# Patient Record
Sex: FEMALE | Race: WHITE | Employment: OTHER | URBAN - NONMETROPOLITAN AREA
[De-identification: names, ages, dates, MRNs, and addresses within clinical notes are randomized per-mention and may not be internally consistent; named-entity substitution may affect disease eponyms.]

---

## 2018-04-05 ENCOUNTER — TRANSFERRED RECORDS (OUTPATIENT)
Dept: HEALTH INFORMATION MANAGEMENT | Facility: HOSPITAL | Age: 73
End: 2018-04-05

## 2018-05-01 RX ORDER — MULTIPLE VITAMINS W/ MINERALS TAB 9MG-400MCG
1 TAB ORAL DAILY
COMMUNITY

## 2018-05-08 ENCOUNTER — ANESTHESIA (OUTPATIENT)
Dept: SURGERY | Facility: HOSPITAL | Age: 73
End: 2018-05-08
Payer: MEDICARE

## 2018-05-08 ENCOUNTER — ANESTHESIA EVENT (OUTPATIENT)
Dept: SURGERY | Facility: HOSPITAL | Age: 73
End: 2018-05-08
Payer: MEDICARE

## 2018-05-08 ENCOUNTER — HOSPITAL ENCOUNTER (OUTPATIENT)
Facility: HOSPITAL | Age: 73
Discharge: HOME | End: 2018-05-08
Attending: OPHTHALMOLOGY | Admitting: OPHTHALMOLOGY
Payer: MEDICARE

## 2018-05-08 ENCOUNTER — SURGERY (OUTPATIENT)
Age: 73
End: 2018-05-08

## 2018-05-08 VITALS
SYSTOLIC BLOOD PRESSURE: 102 MMHG | TEMPERATURE: 97.1 F | HEIGHT: 63 IN | BODY MASS INDEX: 21.97 KG/M2 | WEIGHT: 124 LBS | DIASTOLIC BLOOD PRESSURE: 60 MMHG | OXYGEN SATURATION: 97 % | RESPIRATION RATE: 18 BRPM

## 2018-05-08 PROCEDURE — 36000056 ZZH SURGERY LEVEL 3 1ST 30 MIN: Performed by: OPHTHALMOLOGY

## 2018-05-08 PROCEDURE — A9270 NON-COVERED ITEM OR SERVICE: HCPCS | Mod: GY | Performed by: OPHTHALMOLOGY

## 2018-05-08 PROCEDURE — 25000125 ZZHC RX 250: Performed by: OPHTHALMOLOGY

## 2018-05-08 PROCEDURE — 25000128 H RX IP 250 OP 636: Performed by: NURSE ANESTHETIST, CERTIFIED REGISTERED

## 2018-05-08 PROCEDURE — 25000132 ZZH RX MED GY IP 250 OP 250 PS 637: Mod: GY | Performed by: OPHTHALMOLOGY

## 2018-05-08 PROCEDURE — 66984 XCAPSL CTRC RMVL W/O ECP: CPT | Performed by: NURSE ANESTHETIST, CERTIFIED REGISTERED

## 2018-05-08 PROCEDURE — 71000027 ZZH RECOVERY PHASE 2 EACH 15 MINS: Performed by: OPHTHALMOLOGY

## 2018-05-08 PROCEDURE — 40000306 ZZH STATISTIC PRE PROC ASSESS II: Performed by: OPHTHALMOLOGY

## 2018-05-08 PROCEDURE — 37000008 ZZH ANESTHESIA TECHNICAL FEE, 1ST 30 MIN: Performed by: OPHTHALMOLOGY

## 2018-05-08 PROCEDURE — 25000128 H RX IP 250 OP 636: Performed by: OPHTHALMOLOGY

## 2018-05-08 PROCEDURE — V2632 POST CHMBR INTRAOCULAR LENS: HCPCS | Performed by: OPHTHALMOLOGY

## 2018-05-08 PROCEDURE — 99100 ANES PT EXTEME AGE<1 YR&>70: CPT | Performed by: NURSE ANESTHETIST, CERTIFIED REGISTERED

## 2018-05-08 PROCEDURE — 27210794 ZZH OR GENERAL SUPPLY STERILE: Performed by: OPHTHALMOLOGY

## 2018-05-08 DEVICE — LENS-SOFPORT 17.0: Type: IMPLANTABLE DEVICE | Site: EYE | Status: FUNCTIONAL

## 2018-05-08 RX ORDER — MOXIFLOXACIN 5 MG/ML
SOLUTION/ DROPS OPHTHALMIC PRN
Status: DISCONTINUED | OUTPATIENT
Start: 2018-05-08 | End: 2018-05-08 | Stop reason: HOSPADM

## 2018-05-08 RX ORDER — LIDOCAINE HYDROCHLORIDE 10 MG/ML
INJECTION, SOLUTION EPIDURAL; INFILTRATION; INTRACAUDAL; PERINEURAL PRN
Status: DISCONTINUED | OUTPATIENT
Start: 2018-05-08 | End: 2018-05-08 | Stop reason: HOSPADM

## 2018-05-08 RX ORDER — PROPARACAINE HYDROCHLORIDE 5 MG/ML
1 SOLUTION/ DROPS OPHTHALMIC ONCE
Status: COMPLETED | OUTPATIENT
Start: 2018-05-08 | End: 2018-05-08

## 2018-05-08 RX ORDER — PHENYLEPHRINE HYDROCHLORIDE 100 MG/ML
1 SOLUTION/ DROPS OPHTHALMIC ONCE
Status: COMPLETED | OUTPATIENT
Start: 2018-05-08 | End: 2018-05-08

## 2018-05-08 RX ORDER — PHENYLEPHRINE HYDROCHLORIDE 25 MG/ML
1 SOLUTION/ DROPS OPHTHALMIC ONCE
Status: COMPLETED | OUTPATIENT
Start: 2018-05-08 | End: 2018-05-08

## 2018-05-08 RX ORDER — TETRACAINE HYDROCHLORIDE 5 MG/ML
SOLUTION OPHTHALMIC PRN
Status: DISCONTINUED | OUTPATIENT
Start: 2018-05-08 | End: 2018-05-08 | Stop reason: HOSPADM

## 2018-05-08 RX ORDER — ONDANSETRON 4 MG/1
4 TABLET, ORALLY DISINTEGRATING ORAL EVERY 30 MIN PRN
Status: DISCONTINUED | OUTPATIENT
Start: 2018-05-08 | End: 2018-05-08 | Stop reason: HOSPADM

## 2018-05-08 RX ORDER — NALOXONE HYDROCHLORIDE 0.4 MG/ML
.1-.4 INJECTION, SOLUTION INTRAMUSCULAR; INTRAVENOUS; SUBCUTANEOUS
Status: DISCONTINUED | OUTPATIENT
Start: 2018-05-08 | End: 2018-05-08 | Stop reason: HOSPADM

## 2018-05-08 RX ORDER — CYCLOPENTOLATE HYDROCHLORIDE 20 MG/ML
1 SOLUTION/ DROPS OPHTHALMIC
Status: COMPLETED | OUTPATIENT
Start: 2018-05-08 | End: 2018-05-08

## 2018-05-08 RX ORDER — FENTANYL CITRATE 50 UG/ML
25-50 INJECTION, SOLUTION INTRAMUSCULAR; INTRAVENOUS
Status: DISCONTINUED | OUTPATIENT
Start: 2018-05-08 | End: 2018-05-08 | Stop reason: HOSPADM

## 2018-05-08 RX ORDER — PHENYLEPHRINE HYDROCHLORIDE 100 MG/ML
1 SOLUTION/ DROPS OPHTHALMIC
Status: DISCONTINUED | OUTPATIENT
Start: 2018-05-08 | End: 2018-05-08 | Stop reason: HOSPADM

## 2018-05-08 RX ORDER — LEVOBUNOLOL HYDROCHLORIDE 5 MG/ML
SOLUTION/ DROPS OPHTHALMIC PRN
Status: DISCONTINUED | OUTPATIENT
Start: 2018-05-08 | End: 2018-05-08 | Stop reason: HOSPADM

## 2018-05-08 RX ORDER — ONDANSETRON 2 MG/ML
4 INJECTION INTRAMUSCULAR; INTRAVENOUS EVERY 30 MIN PRN
Status: DISCONTINUED | OUTPATIENT
Start: 2018-05-08 | End: 2018-05-08 | Stop reason: HOSPADM

## 2018-05-08 RX ORDER — SODIUM CHLORIDE, SODIUM LACTATE, POTASSIUM CHLORIDE, CALCIUM CHLORIDE 600; 310; 30; 20 MG/100ML; MG/100ML; MG/100ML; MG/100ML
INJECTION, SOLUTION INTRAVENOUS CONTINUOUS
Status: DISCONTINUED | OUTPATIENT
Start: 2018-05-08 | End: 2018-05-08 | Stop reason: HOSPADM

## 2018-05-08 RX ORDER — PILOCARPINE HYDROCHLORIDE 10 MG/ML
SOLUTION/ DROPS OPHTHALMIC PRN
Status: DISCONTINUED | OUTPATIENT
Start: 2018-05-08 | End: 2018-05-08 | Stop reason: HOSPADM

## 2018-05-08 RX ORDER — ACETAMINOPHEN 325 MG/1
650 TABLET ORAL ONCE
Status: COMPLETED | OUTPATIENT
Start: 2018-05-08 | End: 2018-05-08

## 2018-05-08 RX ORDER — MEPERIDINE HYDROCHLORIDE 25 MG/ML
12.5 INJECTION INTRAMUSCULAR; INTRAVENOUS; SUBCUTANEOUS
Status: DISCONTINUED | OUTPATIENT
Start: 2018-05-08 | End: 2018-05-08 | Stop reason: HOSPADM

## 2018-05-08 RX ADMIN — MOXIFLOXACIN HYDROCHLORIDE 1 DROP: 5 SOLUTION/ DROPS OPHTHALMIC at 14:10

## 2018-05-08 RX ADMIN — MIDAZOLAM 1 MG: 1 INJECTION INTRAMUSCULAR; INTRAVENOUS at 13:57

## 2018-05-08 RX ADMIN — EPINEPHRINE 300 ML: 1 INJECTION, SOLUTION INTRAMUSCULAR; SUBCUTANEOUS at 14:11

## 2018-05-08 RX ADMIN — PILOCARPINE HYDROCHLORIDE 1 DROP: 10 SOLUTION/ DROPS OPHTHALMIC at 14:09

## 2018-05-08 RX ADMIN — PROPARACAINE HYDROCHLORIDE 1 DROP: 5 SOLUTION/ DROPS OPHTHALMIC at 12:18

## 2018-05-08 RX ADMIN — LIDOCAINE HYDROCHLORIDE 2 ML: 10 INJECTION, SOLUTION EPIDURAL; INFILTRATION; INTRACAUDAL; PERINEURAL at 14:10

## 2018-05-08 RX ADMIN — Medication 0.8 ML: at 14:08

## 2018-05-08 RX ADMIN — ACETAMINOPHEN 650 MG: 325 TABLET ORAL at 12:20

## 2018-05-08 RX ADMIN — PHENYLEPHRINE HYDROCHLORIDE 1 DROP: 25 SOLUTION/ DROPS OPHTHALMIC at 12:29

## 2018-05-08 RX ADMIN — PHENYLEPHRINE HYDROCHLORIDE 1 DROP: 100 SOLUTION/ DROPS OPHTHALMIC at 12:19

## 2018-05-08 RX ADMIN — CYCLOPENTOLATE HYDROCHLORIDE 1 DROP: 20 SOLUTION/ DROPS OPHTHALMIC at 12:29

## 2018-05-08 RX ADMIN — MOXIFLOXACIN 0.5 ML: 5 SOLUTION/ DROPS OPHTHALMIC at 12:29

## 2018-05-08 RX ADMIN — CYCLOPENTOLATE HYDROCHLORIDE 1 DROP: 20 SOLUTION/ DROPS OPHTHALMIC at 12:18

## 2018-05-08 RX ADMIN — TETRACAINE HYDROCHLORIDE 2 DROP: 5 SOLUTION OPHTHALMIC at 14:08

## 2018-05-08 RX ADMIN — LEVOBUNOLOL HYDROCHLORIDE 1 DROP: 5 SOLUTION/ DROPS OPHTHALMIC at 14:11

## 2018-05-08 RX ADMIN — MIDAZOLAM 1 MG: 1 INJECTION INTRAMUSCULAR; INTRAVENOUS at 13:50

## 2018-05-08 NOTE — ANESTHESIA CARE TRANSFER NOTE
Patient: Natalya Nelson    Procedure(s):  PHACOEMULSIFICATION CATARACT EXTRACTION POSTERIOR CHAMBER LENS LEFT  - Wound Class: I-Clean    Diagnosis: COMBINED CATARACT LEFT  Diagnosis Additional Information: No value filed.    Anesthesia Type:   MAC     Note:  Airway :Nasal Cannula  Patient transferred to:Phase II  Handoff Report: Identifed the Patient, Identified the Reponsible Provider, Reviewed the pertinent medical history, Discussed the surgical course, Reviewed Intra-OP anesthesia mangement and issues during anesthesia, Set expectations for post-procedure period and Allowed opportunity for questions and acknowledgement of understanding      Vitals: (Last set prior to Anesthesia Care Transfer)    CRNA VITALS  5/8/2018 1346 - 5/8/2018 1441      5/8/2018             NIBP: 103/55    Pulse: 52    NIBP Mean: 73    Ht Rate: 51    SpO2: 100 %    Resp Rate (set): 8                Electronically Signed By: ANNAMARIE Larios CRNA  May 8, 2018  2:41 PM

## 2018-05-08 NOTE — IP AVS SNAPSHOT
[facility] Preop/Phase II    [address] [address]    Phone:  [phone]                                       After Visit Summary   5/8/2018    Natalya Nelson    MRN: [id number]           After Visit Summary Signature Page     I have received my discharge instructions, and my questions have been answered. I have discussed any challenges I see with this plan with the nurse or doctor.    ..........................................................................................................................................  Patient/Patient Representative Signature      ..........................................................................................................................................  Patient Representative Print Name and Relationship to Patient    ..................................................               ................................................  Date                                            Time    ..........................................................................................................................................  Reviewed by Signature/Title    ...................................................              ..............................................  Date                                                            Time

## 2018-05-08 NOTE — ANESTHESIA POSTPROCEDURE EVALUATION
Patient: Natalya Nelson    Procedure(s):  PHACOEMULSIFICATION CATARACT EXTRACTION POSTERIOR CHAMBER LENS LEFT  - Wound Class: I-Clean    Diagnosis:COMBINED CATARACT LEFT  Diagnosis Additional Information: No value filed.    Anesthesia Type:  MAC    Note:  Anesthesia Post Evaluation    Patient location during evaluation: Phase 2  Patient participation: Able to fully participate in evaluation  Level of consciousness: awake and alert  Pain management: adequate  Airway patency: patent  Cardiovascular status: acceptable  Respiratory status: acceptable  Hydration status: acceptable  PONV: none             Last vitals:  Vitals:    05/08/18 1221 05/08/18 1420 05/08/18 1425   BP: 139/66 102/60    Resp: 18     Temp: 97.1  F (36.2  C)     SpO2: 95%  97%         Electronically Signed By: ANNAMARIE Hilton CRNA  May 8, 2018  3:49 PM

## 2018-05-08 NOTE — ANESTHESIA PREPROCEDURE EVALUATION
Anesthesia Evaluation     . Pt has had prior anesthetic.     No history of anesthetic complications          ROS/MED HX    ENT/Pulmonary:  - neg pulmonary ROS     Neurologic:  - neg neurologic ROS   (+)numbness of foot,dysphonia, globus pharyngeus    Cardiovascular:     (+) hypertension----. : . . . :. .       METS/Exercise Tolerance:     Hematologic: Comments: Macrocytosis, thrombocytopenia,, leukopenia    (+) Anemia, Other Hematologic Disorder-leukopenia      Musculoskeletal:   (+) , , other musculoskeletal- lumbago      GI/Hepatic: Comment: Laryngopharyngeal reflux disease    (+) GERD Asymptomatic on medication, Other GI/Hepatic lingual tonsil hypertrophy      Renal/Genitourinary:  - ROS Renal section negative       Endo:     (+) type I DM, .      Psychiatric:  - neg psychiatric ROS       Infectious Disease:  - neg infectious disease ROS       Malignancy:      - no malignancy   Other:    - neg other ROS                 Physical Exam  Normal systems: cardiovascular and pulmonary    Airway   Mallampati: II  TM distance: >3 FB    Dental   (+) upper dentures and lower dentures    Cardiovascular   Rhythm and rate: regular and normal      Pulmonary    breath sounds clear to auscultation                    Anesthesia Plan      History & Physical Review  History and physical reviewed and following examination; no interval change.    ASA Status:  3 .    NPO Status:  > 8 hours    Plan for MAC with Intravenous induction. Maintenance will be Balanced.  Reason for MAC:  Procedure to face, neck, head or breast  PONV prophylaxis:  Ondansetron (or other 5HT-3)  Risks and benefits of MAC anesthetic discussed including dental damage, aspiration, loss of airway, conversion to general anesthetic, CV complications, MI, stroke, death. Pt wishes to proceed.       Postoperative Care  Postoperative pain management:  IV analgesics.      Consents  Anesthetic plan, risks, benefits and alternatives discussed with:  Patient..                           .

## 2018-05-08 NOTE — OP NOTE
[facility]  Ophthalmology Full Operative Note    Pre-operative diagnosis: COMBINED CATARACT LEFT   Post-operative diagnosis Same   Procedure: Procedure(s):  PHACOEMULSIFICATION CATARACT EXTRACTION POSTERIOR CHAMBER LENS LEFT  - Wound Class: I-Clean   Surgeon: Robin Keane MD   Assistants(s):    Anesthesia: Combined MAC with Topical    Estimated blood loss: None    Total IV fluids: (See anesthesia record)   Specimens: None   Implants: 17 LI61AO   Findings:    Complications: None   Condition: Stable   Comments:       PROCEDURAL ANESTHESIA:     Topical/MAC with IV sedation.  Lidocaine 2% jelly topically and Lidocaine 1% preservative-free intracamerally.     PROCEDURE:  The patient was brought to the Operating Room and prepped and draped in a sterile manner.  A wire lid speculum was placed.  A paracentesis was created and 1% Lidocaine was instilled in the anterior chamber.  The anterior chamber was then filled with Amvisc viscoelastic.  A clear cornea temporal wound was created using a 2.8 mm keratome.  A cystotome was used to initiate a flap in the anterior capsule and a Utrata forceps was used to create a continuous tear capsulorhexis.  Hydrodissection was performed.  The phacoemulsification tip was inserted into the eye and the nucleus and epinucleus were removed using a divide and conquer technique.  The residual cortex was removed using the I/A handpiece.  The anterior chamber was then refilled with viscoelastic and the wound was enlarged with the keratome.  The intraocular lens, 17 diopter, Model LI61AO, was placed into the injector and injected into the capsular bag. It was checked to make sure that it was central and stable.  Residual viscoelastic was removed using the I/A.  The anterior chamber was refilled with BSS.  The wounds were hydrated with BSS and were noted to be watertight with no suture necessary.  Topical pilocarpine 1%, Betagan, and Vigamox was applied.  A hard shield was placed.      The patient tolerated the procedure well and was sent to the Recovery Room in satisfactory condition.

## 2018-05-08 NOTE — IP AVS SNAPSHOT
MRN:[id number]                      After Visit Summary   5/8/2018    Natalya Nelson    MRN: [id number]           Thank you!     Thank you for choosing [facility] for your care. Our goal is always to provide you with excellent care. Hearing back from our patients is one way we can continue to improve our services. Please take a few minutes to complete the written survey that you may receive in the mail after you visit with us. Thank you!        Patient Information     Date Of Birth          1945        About your hospital stay     You were admitted on:  May 8, 2018 You last received care in the:  [facility] Preop/Phase II    You were discharged on:  May 8, 2018        Reason for your hospital stay       Cataract surgery left eye done.                  Who to Call     For medical emergencies, please call 911.  For non-urgent questions about your medical care, please call your primary care provider or clinic, [phone]  For questions related to your surgery, please call your surgery clinic        Attending Provider     Provider Specialty    Robin Keane MD Ophthalmology       Primary Care Provider Office Phone # Fax #    Jenny HERACLIO Bronson [phone]      After Care Instructions     Nursing Communication 1       Eyedrops, shield, and activities per post op pamphlet.            Patient care order       Patient has Vigamox (moxifloxacin) and Prednisolone 1% and Ketorolac drops at home.  These should be used:  Vigamox (moxifloxacin) 1 drop operative eye QID for 1 week.  Prednisolone 1% 1 drop operatve eye QID for 1 week then TID for 5 weeks.  Ketorolac 1 drop operative eye QID for 1 week, then TID for 5 weeks or until gone.                  Follow-up Appointments     Follow-up and recommended labs and tests       Follow up tomorrow at the [facility].                  Further instructions from your care team           Post-Anesthesia Patient Instructions    IMMEDIATELY FOLLOWING  "SURGERY:  Do not drive or operate machinery for the first twenty four hours after surgery.  Do not make any important decisions for twenty four hours after surgery or while taking narcotic pain medications or sedatives.  If you develop intractable nausea and vomiting or a severe headache please notify your doctor immediately.    FOLLOW-UP:  Please make an appointment with your surgeon as instructed. You do not need to follow up with anesthesia unless specifically instructed to do so.    WOUND CARE INSTRUCTIONS (if applicable):  Keep a dry clean dressing on the anesthesia/puncture wound site if there is drainage.  Once the wound has quit draining you may leave it open to air.  Generally you should leave the bandage intact for twenty four hours unless there is drainage.  If the epidural site drains for more than 36-48 hours please call the anesthesia department.    QUESTIONS?:  Please feel free to call your physician or the hospital  if you have any questions, and they will be happy to assist you.       Pending Results     No orders found from 5/6/2018 to 5/9/2018.            Admission Information     Date & Time Provider Department Dept. Phone    5/8/2018 Robin Keane MD [facility] Preop/Phase [phone]      Your Vitals Were     Blood Pressure Temperature Respirations Height Weight Pulse Oximetry    139/66 97.1  F (36.2  C) (Oral) 18 1.6 m (5' 3\") 56.2 kg (124 lb) 95%    BMI (Body Mass Index)                   21.97 kg/m2           iWantoo Information     iWantoo lets you send messages to your doctor, view your test results, renew your prescriptions, schedule appointments and more. To sign up, go to [email or web address] . Click on \"Log in\" on the left side of the screen, which will take you to the Welcome page. Then click on \"Sign up Now\" on the right side of the page.     You will be asked to enter the access code listed below, as well as some personal information. Please follow the directions to " create your username and password.     Your access code is: [email or web address]  Expires: 2018  2:27 PM     Your access code will  in 90 days. If you need help or a new code, please call your [place] clinic or [phone].        Care EveryWhere ID     This is your Care EveryWhere ID. This could be used by other organizations to access your [place] medical records  [id number]        Equal Access to Services     NASRIN JOHNSON : Hadii judith argueta hadasho Soomaali, waaxda luqadaha, qaybta kaalmada adeegyada, waxramona newbyin haysushman aderohit funezeliseoameena clifton . So [facility] [phone].    ATENCIÓN: Si habla español, tiene a swenson disposición servicios gratuitos de asistencia lingüística. [facility] [phone].    We comply with applicable federal civil rights laws and [place] laws. We do not discriminate on the basis of race, color, national origin, age, disability, sex, sexual orientation, or gender identity.               Review of your medicines      CONTINUE these medicines which have NOT CHANGED        Dose / Directions    ARICEPT PO        Dose:  5 mg   Take 5 mg by mouth At Bedtime   Refills:  0       ASPIRIN PO        Dose:  81 mg   Take 81 mg by mouth   Refills:  0       calcium-vitamin D 600-400 MG-UNIT per tablet   Commonly known as:  CALTRATE        Dose:  1 tablet   Take 1 tablet by mouth 2 times daily   Refills:  0       FOSAMAX PO        Dose:  70 mg   Take 70 mg by mouth once a week   Refills:  0       LIPITOR PO        Dose:  40 mg   Take 40 mg by mouth daily   Refills:  0       LISINOPRIL PO        Dose:  20 mg   Take 20 mg by mouth daily   Refills:  0       METFORMIN HCL PO        Dose:  1000 mg   Take 1,000 mg by mouth 2 times daily (with meals)   Refills:  0       Multi-vitamin Tabs tablet        Dose:  1 tablet   Take 1 tablet by mouth daily   Refills:  0       omeprazole 40 MG capsule   Commonly known as:  priLOSEC   Used for:  LPRD (laryngopharyngeal reflux disease)        Dose:  40 mg   Take 1 capsule (40  mg) by mouth daily Take 30-60 minutes before a meal.   Quantity:  90 capsule   Refills:  3                Protect others around you: Learn how to safely use, store and throw away your medicines at www.disposemymeds.org.             Medication List: This is a list of all your medications and when to take them. Check marks below indicate your daily home schedule. Keep this list as a reference.      Medications           Morning Afternoon Evening Bedtime As Needed    ARICEPT PO   Take 5 mg by mouth At Bedtime                                ASPIRIN PO   Take 81 mg by mouth                                calcium-vitamin D 600-400 MG-UNIT per tablet   Commonly known as:  CALTRATE   Take 1 tablet by mouth 2 times daily                                FOSAMAX PO   Take 70 mg by mouth once a week                                LIPITOR PO   Take 40 mg by mouth daily                                LISINOPRIL PO   Take 20 mg by mouth daily                                METFORMIN HCL PO   Take 1,000 mg by mouth 2 times daily (with meals)                                Multi-vitamin Tabs tablet   Take 1 tablet by mouth daily                                omeprazole 40 MG capsule   Commonly known as:  priLOSEC   Take 1 capsule (40 mg) by mouth daily Take 30-60 minutes before a meal.

## 2018-05-09 ENCOUNTER — TRANSFERRED RECORDS (OUTPATIENT)
Dept: HEALTH INFORMATION MANAGEMENT | Facility: HOSPITAL | Age: 73
End: 2018-05-09

## 2018-05-21 ENCOUNTER — ANESTHESIA EVENT (OUTPATIENT)
Dept: SURGERY | Facility: HOSPITAL | Age: 73
End: 2018-05-21
Payer: MEDICARE

## 2018-05-21 NOTE — ANESTHESIA PREPROCEDURE EVALUATION
Anesthesia Evaluation     . Pt has had prior anesthetic.     No history of anesthetic complications          ROS/MED HX    ENT/Pulmonary:     (+), . tonsil hypertrophy.    Neurologic: Comment: Start of dementia    (+)neuropathy - foot, other neuro globus pharyngeus    Cardiovascular:     (+) Dyslipidemia, hypertension-range: not on beta blocer, ---. : . . . :. .       METS/Exercise Tolerance:  4 - Raking leaves, gardening   Hematologic:     (+) Anemia, Other Hematologic Disorder- leukopenia      Musculoskeletal:   (+) , , other musculoskeletal- lumbago      GI/Hepatic:  - neg GI/hepatic ROS   (+) Other GI/Hepatic LPRD      Renal/Genitourinary:  - ROS Renal section negative       Endo:     (+) type II DM Last HgA1c: 6.4 date: 4/18 .      Psychiatric:  - neg psychiatric ROS       Infectious Disease:  - neg infectious disease ROS       Malignancy:      - no malignancy   Other:    - neg other ROS                 Physical Exam  Normal systems: cardiovascular, pulmonary and dental    Airway   Mallampati: II  TM distance: >3 FB  Neck ROM: full    Dental   (+) upper dentures and lower dentures    Cardiovascular   Rhythm and rate: regular and normal      Pulmonary    breath sounds clear to auscultation                    Anesthesia Plan      History & Physical Review  History and physical reviewed and following examination; no interval change.    ASA Status:  3 .    NPO Status:  > 4 hours    Plan for MAC Reason for MAC:  Chronic cardiopulmonary disease (G9) and Procedure to face, neck, head or breast    Risks and benefits of MAC anesthetic discussed including dental damage, aspiration, loss of airway, conversion to general anesthetic, CV complications, MI, stroke, death. Pt wishes to proceed.       Postoperative Care      Consents  Anesthetic plan, risks, benefits and alternatives discussed with:  Patient..                          .

## 2018-05-22 ENCOUNTER — HOSPITAL ENCOUNTER (OUTPATIENT)
Facility: HOSPITAL | Age: 73
Discharge: HOME | End: 2018-05-22
Attending: OPHTHALMOLOGY | Admitting: OPHTHALMOLOGY
Payer: MEDICARE

## 2018-05-22 ENCOUNTER — SURGERY (OUTPATIENT)
Age: 73
End: 2018-05-22

## 2018-05-22 ENCOUNTER — ANESTHESIA (OUTPATIENT)
Dept: SURGERY | Facility: HOSPITAL | Age: 73
End: 2018-05-22
Payer: MEDICARE

## 2018-05-22 VITALS
HEIGHT: 63 IN | TEMPERATURE: 97.8 F | BODY MASS INDEX: 21.62 KG/M2 | SYSTOLIC BLOOD PRESSURE: 110 MMHG | WEIGHT: 122 LBS | OXYGEN SATURATION: 96 % | DIASTOLIC BLOOD PRESSURE: 65 MMHG | RESPIRATION RATE: 18 BRPM

## 2018-05-22 PROCEDURE — 99100 ANES PT EXTEME AGE<1 YR&>70: CPT | Performed by: NURSE ANESTHETIST, CERTIFIED REGISTERED

## 2018-05-22 PROCEDURE — V2632 POST CHMBR INTRAOCULAR LENS: HCPCS | Performed by: OPHTHALMOLOGY

## 2018-05-22 PROCEDURE — 25000125 ZZHC RX 250: Performed by: OPHTHALMOLOGY

## 2018-05-22 PROCEDURE — 25000128 H RX IP 250 OP 636: Performed by: NURSE ANESTHETIST, CERTIFIED REGISTERED

## 2018-05-22 PROCEDURE — 25000132 ZZH RX MED GY IP 250 OP 250 PS 637: Mod: GY | Performed by: OPHTHALMOLOGY

## 2018-05-22 PROCEDURE — 25000128 H RX IP 250 OP 636: Performed by: OPHTHALMOLOGY

## 2018-05-22 PROCEDURE — A9270 NON-COVERED ITEM OR SERVICE: HCPCS | Mod: GY | Performed by: OPHTHALMOLOGY

## 2018-05-22 PROCEDURE — 66984 XCAPSL CTRC RMVL W/O ECP: CPT | Performed by: NURSE ANESTHETIST, CERTIFIED REGISTERED

## 2018-05-22 PROCEDURE — 27210794 ZZH OR GENERAL SUPPLY STERILE: Performed by: OPHTHALMOLOGY

## 2018-05-22 PROCEDURE — 40000306 ZZH STATISTIC PRE PROC ASSESS II: Performed by: OPHTHALMOLOGY

## 2018-05-22 PROCEDURE — 36000056 ZZH SURGERY LEVEL 3 1ST 30 MIN: Performed by: OPHTHALMOLOGY

## 2018-05-22 PROCEDURE — 37000008 ZZH ANESTHESIA TECHNICAL FEE, 1ST 30 MIN: Performed by: OPHTHALMOLOGY

## 2018-05-22 PROCEDURE — 71000027 ZZH RECOVERY PHASE 2 EACH 15 MINS: Performed by: OPHTHALMOLOGY

## 2018-05-22 DEVICE — LENS-SOFPORT 17.0: Type: IMPLANTABLE DEVICE | Site: EYE | Status: FUNCTIONAL

## 2018-05-22 RX ORDER — LIDOCAINE HYDROCHLORIDE 10 MG/ML
INJECTION, SOLUTION EPIDURAL; INFILTRATION; INTRACAUDAL; PERINEURAL PRN
Status: DISCONTINUED | OUTPATIENT
Start: 2018-05-22 | End: 2018-05-22 | Stop reason: HOSPADM

## 2018-05-22 RX ORDER — PHENYLEPHRINE HYDROCHLORIDE 100 MG/ML
1 SOLUTION/ DROPS OPHTHALMIC ONCE
Status: COMPLETED | OUTPATIENT
Start: 2018-05-22 | End: 2018-05-22

## 2018-05-22 RX ORDER — LEVOBUNOLOL HYDROCHLORIDE 5 MG/ML
SOLUTION/ DROPS OPHTHALMIC PRN
Status: DISCONTINUED | OUTPATIENT
Start: 2018-05-22 | End: 2018-05-22 | Stop reason: HOSPADM

## 2018-05-22 RX ORDER — ONDANSETRON 4 MG/1
4 TABLET, ORALLY DISINTEGRATING ORAL EVERY 30 MIN PRN
Status: DISCONTINUED | OUTPATIENT
Start: 2018-05-22 | End: 2018-05-22 | Stop reason: HOSPADM

## 2018-05-22 RX ORDER — PHENYLEPHRINE HYDROCHLORIDE 25 MG/ML
1 SOLUTION/ DROPS OPHTHALMIC ONCE
Status: COMPLETED | OUTPATIENT
Start: 2018-05-22 | End: 2018-05-22

## 2018-05-22 RX ORDER — PROPARACAINE HYDROCHLORIDE 5 MG/ML
1 SOLUTION/ DROPS OPHTHALMIC ONCE
Status: COMPLETED | OUTPATIENT
Start: 2018-05-22 | End: 2018-05-22

## 2018-05-22 RX ORDER — ONDANSETRON 2 MG/ML
4 INJECTION INTRAMUSCULAR; INTRAVENOUS EVERY 30 MIN PRN
Status: DISCONTINUED | OUTPATIENT
Start: 2018-05-22 | End: 2018-05-22 | Stop reason: HOSPADM

## 2018-05-22 RX ORDER — PILOCARPINE HYDROCHLORIDE 10 MG/ML
SOLUTION/ DROPS OPHTHALMIC PRN
Status: DISCONTINUED | OUTPATIENT
Start: 2018-05-22 | End: 2018-05-22 | Stop reason: HOSPADM

## 2018-05-22 RX ORDER — ACETAMINOPHEN 325 MG/1
650 TABLET ORAL ONCE
Status: COMPLETED | OUTPATIENT
Start: 2018-05-22 | End: 2018-05-22

## 2018-05-22 RX ORDER — PHENYLEPHRINE HYDROCHLORIDE 100 MG/ML
1 SOLUTION/ DROPS OPHTHALMIC
Status: COMPLETED | OUTPATIENT
Start: 2018-05-22 | End: 2018-05-22

## 2018-05-22 RX ORDER — LIDOCAINE 40 MG/G
CREAM TOPICAL
Status: DISCONTINUED | OUTPATIENT
Start: 2018-05-22 | End: 2018-05-22 | Stop reason: HOSPADM

## 2018-05-22 RX ORDER — MEPERIDINE HYDROCHLORIDE 25 MG/ML
12.5 INJECTION INTRAMUSCULAR; INTRAVENOUS; SUBCUTANEOUS
Status: DISCONTINUED | OUTPATIENT
Start: 2018-05-22 | End: 2018-05-22 | Stop reason: HOSPADM

## 2018-05-22 RX ORDER — MOXIFLOXACIN 5 MG/ML
SOLUTION/ DROPS OPHTHALMIC PRN
Status: DISCONTINUED | OUTPATIENT
Start: 2018-05-22 | End: 2018-05-22 | Stop reason: HOSPADM

## 2018-05-22 RX ORDER — TETRACAINE HYDROCHLORIDE 5 MG/ML
SOLUTION OPHTHALMIC PRN
Status: DISCONTINUED | OUTPATIENT
Start: 2018-05-22 | End: 2018-05-22 | Stop reason: HOSPADM

## 2018-05-22 RX ORDER — NALOXONE HYDROCHLORIDE 0.4 MG/ML
.1-.4 INJECTION, SOLUTION INTRAMUSCULAR; INTRAVENOUS; SUBCUTANEOUS
Status: DISCONTINUED | OUTPATIENT
Start: 2018-05-22 | End: 2018-05-22 | Stop reason: HOSPADM

## 2018-05-22 RX ORDER — CYCLOPENTOLATE HYDROCHLORIDE 20 MG/ML
1 SOLUTION/ DROPS OPHTHALMIC
Status: COMPLETED | OUTPATIENT
Start: 2018-05-22 | End: 2018-05-22

## 2018-05-22 RX ORDER — SODIUM CHLORIDE, SODIUM LACTATE, POTASSIUM CHLORIDE, CALCIUM CHLORIDE 600; 310; 30; 20 MG/100ML; MG/100ML; MG/100ML; MG/100ML
INJECTION, SOLUTION INTRAVENOUS CONTINUOUS
Status: DISCONTINUED | OUTPATIENT
Start: 2018-05-22 | End: 2018-05-22 | Stop reason: HOSPADM

## 2018-05-22 RX ADMIN — PHENYLEPHRINE HYDROCHLORIDE 1 DROP: 25 SOLUTION/ DROPS OPHTHALMIC at 11:35

## 2018-05-22 RX ADMIN — EPINEPHRINE 300 ML: 1 INJECTION, SOLUTION INTRAMUSCULAR; SUBCUTANEOUS at 13:17

## 2018-05-22 RX ADMIN — PHENYLEPHRINE HYDROCHLORIDE 1 DROP: 100 SOLUTION/ DROPS OPHTHALMIC at 11:42

## 2018-05-22 RX ADMIN — CYCLOPENTOLATE HYDROCHLORIDE 1 DROP: 20 SOLUTION/ DROPS OPHTHALMIC at 11:34

## 2018-05-22 RX ADMIN — MOXIFLOXACIN HYDROCHLORIDE 1 DROP: 5 SOLUTION/ DROPS OPHTHALMIC at 13:16

## 2018-05-22 RX ADMIN — MIDAZOLAM 1 MG: 1 INJECTION INTRAMUSCULAR; INTRAVENOUS at 12:59

## 2018-05-22 RX ADMIN — PROPARACAINE HYDROCHLORIDE 1 DROP: 5 SOLUTION/ DROPS OPHTHALMIC at 11:34

## 2018-05-22 RX ADMIN — Medication 0.8 ML: at 13:14

## 2018-05-22 RX ADMIN — TETRACAINE HYDROCHLORIDE 2 DROP: 5 SOLUTION OPHTHALMIC at 13:13

## 2018-05-22 RX ADMIN — LEVOBUNOLOL HYDROCHLORIDE 1 DROP: 5 SOLUTION/ DROPS OPHTHALMIC at 13:17

## 2018-05-22 RX ADMIN — PILOCARPINE HYDROCHLORIDE 1 DROP: 10 SOLUTION/ DROPS OPHTHALMIC at 13:16

## 2018-05-22 RX ADMIN — LIDOCAINE HYDROCHLORIDE 2 ML: 10 INJECTION, SOLUTION EPIDURAL; INFILTRATION; INTRACAUDAL; PERINEURAL at 13:16

## 2018-05-22 RX ADMIN — ACETAMINOPHEN 650 MG: 325 TABLET ORAL at 11:18

## 2018-05-22 RX ADMIN — CYCLOPENTOLATE HYDROCHLORIDE 1 DROP: 20 SOLUTION/ DROPS OPHTHALMIC at 11:41

## 2018-05-22 RX ADMIN — MOXIFLOXACIN 0.5 ML: 5 SOLUTION/ DROPS OPHTHALMIC at 11:42

## 2018-05-22 RX ADMIN — PHENYLEPHRINE HYDROCHLORIDE 1 DROP: 100 SOLUTION/ DROPS OPHTHALMIC at 12:05

## 2018-05-22 NOTE — IP AVS SNAPSHOT
MRN:[id number]                      After Visit Summary   5/22/2018    Natalya Nelson    MRN: [id number]           Thank you!     Thank you for choosing [facility] for your care. Our goal is always to provide you with excellent care. Hearing back from our patients is one way we can continue to improve our services. Please take a few minutes to complete the written survey that you may receive in the mail after you visit with us. Thank you!        Patient Information     Date Of Birth          1945        About your hospital stay     You were admitted on:  May 22, 2018 You last received care in the:  [facility] Main Operating Room    You were discharged on:  May 22, 2018        Reason for your hospital stay       Cataract surgery right eye done.                  Who to Call     For medical emergencies, please call 911.  For non-urgent questions about your medical care, please call your primary care provider or clinic, [phone]  For questions related to your surgery, please call your surgery clinic        Attending Provider     Provider Specialty    Robin Keane MD Ophthalmology       Primary Care Provider Office Phone # Fax #    Jenny HERACLIO Bronson [phone]      After Care Instructions     Nursing Communication 1       Eyedrops, shield, and activities per post op pamphlet.            Patient care order       Patient has Vigamox (moxiflocacin) and Prednisolone 1% and Ketorolac drops at home.  These should be used:  Vigamox (moxifloxacin) 1 drop operative eye QID for 1 week.  Prednisolone 1% 1 drop operatve eye QID for 1 week then TID for 5 weeks.  Ketorolac 1 drop operative eye QID for 1 week, then TID for 5 weeks or until gone.                  Follow-up Appointments     Follow-up and recommended labs and tests       Follow up tomorrow at the [facility].                  Further instructions from your care team           Post-Anesthesia Patient Instructions    IMMEDIATELY  "FOLLOWING SURGERY:  Do not drive or operate machinery for the first twenty four hours after surgery.  Do not make any important decisions for twenty four hours after surgery or while taking narcotic pain medications or sedatives.  If you develop intractable nausea and vomiting or a severe headache please notify your doctor immediately.    FOLLOW-UP:  Please make an appointment with your surgeon as instructed. You do not need to follow up with anesthesia unless specifically instructed to do so.    WOUND CARE INSTRUCTIONS (if applicable):  Keep a dry clean dressing on the anesthesia/puncture wound site if there is drainage.  Once the wound has quit draining you may leave it open to air.  Generally you should leave the bandage intact for twenty four hours unless there is drainage.  If the epidural site drains for more than 36-48 hours please call the anesthesia department.    QUESTIONS?:  Please feel free to call your physician or the hospital  if you have any questions, and they will be happy to assist you.       Pending Results     No orders found from 5/20/2018 to 5/23/2018.            Admission Information     Date & Time Provider Department Dept. Phone    5/22/2018 Robin Keane MD HI Main Operating Room [phone]      Your Vitals Were     Blood Pressure Temperature Height Weight Pulse Oximetry BMI (Body Mass Index)    121/84 98.2  F (36.8  C) (Oral) 1.6 m (5' 3\") 55.3 kg (122 lb) 95% 21.61 kg/m2      [employer] Information     [employer] lets you send messages to your doctor, view your test results, renew your prescriptions, schedule appointments and more. To sign up, go to [email or web address] . Click on \"Log in\" on the left side of the screen, which will take you to the Welcome page. Then click on \"Sign up Now\" on the right side of the page.     You will be asked to enter the access code listed below, as well as some personal information. Please follow the directions to create your username and " password.     Your access code is: [email or web address]  Expires: 2018  2:27 PM     Your access code will  in 90 days. If you need help or a new code, please call your [place] clinic or [phone].        Care EveryWhere ID     This is your Care EveryWhere ID. This could be used by other organizations to access your [place] medical records  [id number]        Equal Access to Services     NASRIN JOHNSON : Hadii judith argueta hadarabellao Sosamali, waaxda luqadaha, qaybta kaalmada adeegyada, jennifer funezeliseoameena clifton . So [facility] [phone].    ATENCIÓN: Si habla español, tiene a swenson disposición servicios gratuitos de asistencia lingüística. Llame al [phone].    We comply with applicable federal civil rights laws and [place] laws. We do not discriminate on the basis of race, color, national origin, age, disability, sex, sexual orientation, or gender identity.               Review of your medicines      CONTINUE these medicines which have NOT CHANGED        Dose / Directions    ARICEPT PO        Dose:  5 mg   Take 5 mg by mouth At Bedtime   Refills:  0       ASPIRIN PO        Dose:  81 mg   Take 81 mg by mouth   Refills:  0       calcium-vitamin D 600-400 MG-UNIT per tablet   Commonly known as:  CALTRATE        Dose:  1 tablet   Take 1 tablet by mouth 2 times daily   Refills:  0       FOSAMAX PO        Dose:  70 mg   Take 70 mg by mouth once a week   Refills:  0       LIPITOR PO        Dose:  40 mg   Take 40 mg by mouth daily   Refills:  0       LISINOPRIL PO        Dose:  20 mg   Take 20 mg by mouth daily   Refills:  0       METFORMIN HCL PO        Dose:  1000 mg   Take 1,000 mg by mouth 2 times daily (with meals)   Refills:  0       Multi-vitamin Tabs tablet        Dose:  1 tablet   Take 1 tablet by mouth daily   Refills:  0       omeprazole 40 MG capsule   Commonly known as:  priLOSEC   Used for:  LPRD (laryngopharyngeal reflux disease)        Dose:  40 mg   Take 1 capsule (40 mg) by mouth daily Take  30-60 minutes before a meal.   Quantity:  90 capsule   Refills:  3                Protect others around you: Learn how to safely use, store and throw away your medicines at www.disposemymeds.org.             Medication List: This is a list of all your medications and when to take them. Check marks below indicate your daily home schedule. Keep this list as a reference.      Medications           Morning Afternoon Evening Bedtime As Needed    ARICEPT PO   Take 5 mg by mouth At Bedtime                                ASPIRIN PO   Take 81 mg by mouth                                calcium-vitamin D 600-400 MG-UNIT per tablet   Commonly known as:  CALTRATE   Take 1 tablet by mouth 2 times daily                                FOSAMAX PO   Take 70 mg by mouth once a week                                LIPITOR PO   Take 40 mg by mouth daily                                LISINOPRIL PO   Take 20 mg by mouth daily                                METFORMIN HCL PO   Take 1,000 mg by mouth 2 times daily (with meals)                                Multi-vitamin Tabs tablet   Take 1 tablet by mouth daily                                omeprazole 40 MG capsule   Commonly known as:  priLOSEC   Take 1 capsule (40 mg) by mouth daily Take 30-60 minutes before a meal.

## 2018-05-22 NOTE — OR NURSING
Took juice and cookies w/o nausea.Patient and responsible adult given discharge instructions with no questions regarding instructions. Aleja score 20. Pain level 0/10.  Discharged from unit via walking. Patient discharged to home.

## 2018-05-22 NOTE — IP AVS SNAPSHOT
[facility] Operating Room    [address] [place] [address]    Phone:  [phone]                                       After Visit Summary   5/22/2018    Natalya Nelson    MRN: [id number]           After Visit Summary Signature Page     I have received my discharge instructions, and my questions have been answered. I have discussed any challenges I see with this plan with the nurse or doctor.    ..........................................................................................................................................  Patient/Patient Representative Signature      ..........................................................................................................................................  Patient Representative Print Name and Relationship to Patient    ..................................................               ................................................  Date                                            Time    ..........................................................................................................................................  Reviewed by Signature/Title    ...................................................              ..............................................  Date                                                            Time

## 2018-05-22 NOTE — ANESTHESIA POSTPROCEDURE EVALUATION
Patient: Natalya Nelson    Procedure(s):  PHACOEMULSIFICATION CATARCT EXTRACTION POSTERIOR CHAMBER LENS RIGHT,  - Wound Class: I-Clean    Diagnosis:COMBINED CATARACT RIGHT  Diagnosis Additional Information: No value filed.    Anesthesia Type:  MAC    Note:  Anesthesia Post Evaluation    Patient location during evaluation: Bedside  Patient participation: Able to fully participate in evaluation  Level of consciousness: awake and alert  Pain management: adequate  Airway patency: patent  Cardiovascular status: acceptable  Respiratory status: acceptable  Hydration status: acceptable  PONV: none     Anesthetic complications: None          Last vitals:  Vitals:    05/22/18 1340 05/22/18 1345 05/22/18 1350   BP: 118/59 101/70 110/65   Resp: 18 18 18   Temp:   97.8  F (36.6  C)   SpO2: 96% 97% 96%         Electronically Signed By: ANNAMARIE Jarrett CRNA  May 22, 2018  2:16 PM

## 2018-05-22 NOTE — OP NOTE
[facility]  Ophthalmology Full Operative Note    Pre-operative diagnosis: COMBINED CATARACT RIGHT   Post-operative diagnosis Same   Procedure: Procedure(s):  PHACOEMULSIFICATION CATARCT EXTRACTION POSTERIOR CHAMBER LENS RIGHT,  - Wound Class: I-Clean   Surgeon: Robin Keane MD   Assistants(s):    Anesthesia: Combined MAC with Topical    Estimated blood loss: None    Total IV fluids: (See anesthesia record)   Specimens: None   Implants: 17 LI61AO   Findings:    Complications: None   Condition: Stable   Comments:       PROCEDURAL ANESTHESIA:     Topical/MAC.  Lidocaine 2% jelly topically and Lidocaine 1% preservative-free intracamerally.    PROCEDURE:  The patient was brought to the Operating Room and prepped and draped in a sterile manner.  A wire lid speculum was placed.  A paracentesis was created and 1% Lidocaine was instilled in the anterior chamber.  The anterior chamber was then filled with Amvisc viscoelastic.  A clear cornea temporal wound was created using a 2.8 mm keratome.  A cystotome was used to initiate a flap in the anterior capsule and a Utrata forceps was used to create a continuous tear capsulorhexis.  Hydrodissection was performed.  The phacoemulsification tip was inserted into the eye and the nucleus and epinucleus were removed using a divide and conquer technique.  The residual cortex was removed using the I/A handpiece.  The anterior chamber was then refilled with viscoelastic and the wound was enlarged with the keratome.  The intraocular lens, 17 diopter, Model LI61AO, was placed into the injector and injected into the capsular bag. It was checked to make sure that it was central and stable.  Residual viscoelastic was removed using the I/A.  The anterior chamber was refilled with BSS.  The wounds were hydrated with BSS and were noted to be watertight with no suture necessary.  Topical pilocarpine 1%, Betagan, and Vigamox was applied.  A hard shield was placed.     The patient  tolerated the procedure well and was sent to the Recovery Room in satisfactory condition.

## 2018-05-22 NOTE — ANESTHESIA CARE TRANSFER NOTE
Patient: Natalya Nelson    Procedure(s):  PHACOEMULSIFICATION CATARCT EXTRACTION POSTERIOR CHAMBER LENS RIGHT,  - Wound Class: I-Clean    Diagnosis: COMBINED CATARACT RIGHT  Diagnosis Additional Information: No value filed.    Anesthesia Type:   MAC     Note:  Airway :Nasal Cannula  Patient transferred to:Phase II  Handoff Report: Identifed the Patient, Identified the Reponsible Provider, Reviewed the pertinent medical history, Discussed the surgical course, Reviewed Intra-OP anesthesia mangement and issues during anesthesia, Set expectations for post-procedure period and Allowed opportunity for questions and acknowledgement of understanding      Vitals: (Last set prior to Anesthesia Care Transfer)    CRNA VITALS  5/22/2018 1256 - 5/22/2018 1329      5/22/2018             Resp Rate (set): 8                Electronically Signed By: ANNAMARIE Mendiola CRNA  May 22, 2018  1:29 PM

## 2019-04-15 RX ORDER — DONEPEZIL HYDROCHLORIDE 5 MG/1
5 TABLET, FILM COATED ORAL AT BEDTIME
OUTPATIENT
Start: 2019-04-15

## 2019-04-15 RX ORDER — LISINOPRIL 20 MG/1
20 TABLET ORAL DAILY
OUTPATIENT
Start: 2019-04-15

## 2019-04-15 NOTE — TELEPHONE ENCOUNTER
Donepezil HCl (ARICEPT PO)    Last Written Prescription Date:  historical medication   Last Fill Quantity: 0,   # refills: 0  Last Office Visit: 4/27/18  Future Office visit:       Routing refill request to provider for review/approval because:  Medication is reported/historical      lisinopril       Last Written Prescription Date:  historical medication   Last Fill Quantity: 0,   # refills: 0  Last Office Visit: 4/27/18  Future Office visit:       Routing refill request to provider for review/approval because:  Medication is reported/historical

## 2019-06-21 ENCOUNTER — TELEPHONE (OUTPATIENT)
Dept: FAMILY MEDICINE | Facility: OTHER | Age: 74
End: 2019-06-21

## 2020-05-20 ENCOUNTER — TRANSFERRED RECORDS (OUTPATIENT)
Dept: HEALTH INFORMATION MANAGEMENT | Facility: CLINIC | Age: 75
End: 2020-05-20

## 2020-05-20 LAB — RETINOPATHY: NEGATIVE

## 2020-07-11 ENCOUNTER — HOSPITAL ENCOUNTER (EMERGENCY)
Facility: HOSPITAL | Age: 75
Discharge: HOME | End: 2020-07-11
Attending: NURSE PRACTITIONER | Admitting: NURSE PRACTITIONER
Payer: MEDICARE

## 2020-07-11 VITALS
RESPIRATION RATE: 20 BRPM | OXYGEN SATURATION: 98 % | DIASTOLIC BLOOD PRESSURE: 90 MMHG | TEMPERATURE: 97.7 F | SYSTOLIC BLOOD PRESSURE: 147 MMHG

## 2020-07-11 DIAGNOSIS — N39.0 URINARY TRACT INFECTION: ICD-10-CM

## 2020-07-11 LAB
ALBUMIN UR-MCNC: NEGATIVE MG/DL
APPEARANCE UR: CLEAR
BACTERIA #/AREA URNS HPF: ABNORMAL /HPF
BILIRUB UR QL STRIP: NEGATIVE
COLOR UR AUTO: ABNORMAL
GLUCOSE UR STRIP-MCNC: >1000 MG/DL
HGB UR QL STRIP: NEGATIVE
HYALINE CASTS #/AREA URNS LPF: 4 /LPF
KETONES UR STRIP-MCNC: NEGATIVE MG/DL
LEUKOCYTE ESTERASE UR QL STRIP: ABNORMAL
MUCOUS THREADS #/AREA URNS LPF: PRESENT /LPF
NITRATE UR QL: NEGATIVE
PH UR STRIP: 5 PH (ref 4.7–8)
RBC #/AREA URNS AUTO: 0 /HPF (ref 0–2)
SOURCE: ABNORMAL
SP GR UR STRIP: 1.03 (ref 1–1.03)
SQUAMOUS #/AREA URNS AUTO: 3 /HPF (ref 0–1)
UROBILINOGEN UR STRIP-MCNC: NORMAL MG/DL (ref 0–2)
WBC #/AREA URNS AUTO: 12 /HPF (ref 0–5)

## 2020-07-11 PROCEDURE — 99203 OFFICE O/P NEW LOW 30 MIN: CPT | Mod: Z6 | Performed by: NURSE PRACTITIONER

## 2020-07-11 PROCEDURE — 87086 URINE CULTURE/COLONY COUNT: CPT | Performed by: NURSE PRACTITIONER

## 2020-07-11 PROCEDURE — 87186 SC STD MICRODIL/AGAR DIL: CPT | Performed by: NURSE PRACTITIONER

## 2020-07-11 PROCEDURE — 87088 URINE BACTERIA CULTURE: CPT | Performed by: NURSE PRACTITIONER

## 2020-07-11 PROCEDURE — 81001 URINALYSIS AUTO W/SCOPE: CPT | Performed by: NURSE PRACTITIONER

## 2020-07-11 PROCEDURE — G0463 HOSPITAL OUTPT CLINIC VISIT: HCPCS

## 2020-07-11 RX ORDER — SULFAMETHOXAZOLE/TRIMETHOPRIM 800-160 MG
1 TABLET ORAL 2 TIMES DAILY
Qty: 6 TABLET | Refills: 0 | Status: SHIPPED | OUTPATIENT
Start: 2020-07-11 | End: 2020-07-14

## 2020-07-11 RX ORDER — LOPERAMIDE HYDROCHLORIDE 2 MG/1
2 TABLET ORAL 4 TIMES DAILY PRN
COMMUNITY
Start: 2020-06-04

## 2020-07-11 ASSESSMENT — ENCOUNTER SYMPTOMS
HEADACHES: 0
FLANK PAIN: 0
CHILLS: 0
HEMATURIA: 0
ABDOMINAL PAIN: 0
FREQUENCY: 0
DYSURIA: 0
APPETITE CHANGE: 0
FEVER: 0
DIARRHEA: 0
ACTIVITY CHANGE: 0
LIGHT-HEADEDNESS: 0
DIFFICULTY URINATING: 0
FATIGUE: 0
BACK PAIN: 1
DIZZINESS: 0
NAUSEA: 0

## 2020-07-11 NOTE — ED NOTES
Patient discharged with understanding of instructions and recommendations.   Denies any questions or concerns.     Ysabel Wang LPN on 7/11/2020 at 11:28 AM

## 2020-07-11 NOTE — ED AVS SNAPSHOT
[facility]  [address]  [place] [address]  Phone:  [phone]                                    Natalya Nelson   MRN: [id number]    Department:  [facility]   Date of Visit:  7/11/2020           After Visit Summary Signature Page    I have received my discharge instructions, and my questions have been answered. I have discussed any challenges I see with this plan with the nurse or doctor.    ..........................................................................................................................................  Patient/Patient Representative Signature      ..........................................................................................................................................  Patient Representative Print Name and Relationship to Patient    ..................................................               ................................................  Date                                   Time    ..........................................................................................................................................  Reviewed by Signature/Title    ...................................................              ..............................................  Date                                               Time          22EPIC Rev 08/18

## 2020-07-11 NOTE — ED PROVIDER NOTES
"  History     Chief Complaint   Patient presents with     Back Pain     right back. Denies frequency, pain with voiding \"I get this back pain sometimes\"     HPI  Natalya Nelson is a 75 year old female who has had right lower back pain for the past 3 days. The pain comes and goes. Nothing seems to make the pain worse or better, although the pain has been gradually worsening over the past 3 days. Denies injury to the area. Denies nausea. Bowel and bladder as normal. Denies fever, chills, other myalgias. Eating and drinking as normal. Drinking \"lots\" of water. She has not been checking her blood sugars. Denies dysuria or urinary frequency.    Allergies:  No Known Allergies    Problem List:    Patient Active Problem List    Diagnosis Date Noted     Lingual tonsil hypertrophy 10/09/2015     Priority: Medium     Dysphonia 10/09/2015     Priority: Medium     Globus pharyngeus 09/03/2015     Priority: Medium     LPRD (laryngopharyngeal reflux disease) 09/03/2015     Priority: Medium     DM w/o complication type II (H) 07/14/2015     Priority: Medium     Hypertriglyceridemia 07/14/2015     Priority: Medium     Osteopenia 07/14/2015     Priority: Medium     Essential hypertension 07/14/2015     Priority: Medium     Problem list name updated by automated process. Provider to review       Macrocytosis without anemia 07/14/2015     Priority: Medium     Thrombocytopenia (H) 07/14/2015     Priority: Medium     Leukopenia 07/14/2015     Priority: Medium     Lumbago 07/14/2015     Priority: Medium     Numbness of foot 07/14/2015     Priority: Medium     H/O colonoscopy 07/14/2015     Priority: Medium        Past Medical History:    Past Medical History:   Diagnosis Date     Abnormal complete blood count      Carpal tunnel syndrome      Contact dermatitis and other eczema, due to unspecified cause      Elevated BP      Globus hystericus      Hypertriglyceridemia      Leukopenia      Leukopenia      Lumbago      Macrocytosis      " Macrocytosis without anemia      Osteopenia      Other and unspecified hyperlipidemia      Sprain of medial collateral ligament of knee      Thrombocytopaenia      Type 2 diabetes mellitus (H)      Unspecified disorder of gallbladder      Unspecified essential hypertension        Past Surgical History:    Past Surgical History:   Procedure Laterality Date     PHACOEMULSIFICATION WITH STANDARD INTRAOCULAR LENS IMPLANT Left 5/8/2018    Procedure: PHACOEMULSIFICATION WITH STANDARD INTRAOCULAR LENS IMPLANT;  PHACOEMULSIFICATION CATARACT EXTRACTION POSTERIOR CHAMBER LENS LEFT ;  Surgeon: Robin Keane MD;  Location: [facility] OR     PHACOEMULSIFICATION WITH STANDARD INTRAOCULAR LENS IMPLANT Right 5/22/2018    Procedure: PHACOEMULSIFICATION WITH STANDARD INTRAOCULAR LENS IMPLANT;  PHACOEMULSIFICATION CATARCT EXTRACTION POSTERIOR CHAMBER LENS RIGHT, ;  Surgeon: Robin Keane MD;  Location: [facility] OR       Family History:    Family History   Problem Relation Age of Onset     Diabetes Son      Heart Disease Brother      Myocardial Infarction Brother      Myocardial Infarction Brother      Diabetes Brother        Social History:  Marital Status:   [2]  Social History     Tobacco Use     Smoking status: Never Smoker     Smokeless tobacco: Never Used   Substance Use Topics     Alcohol use: None     Drug use: None        Medications:    Alendronate Sodium (FOSAMAX PO)  ASPIRIN PO  Atorvastatin Calcium (LIPITOR PO)  calcium-vitamin D (CALTRATE) 600-400 MG-UNIT per tablet  Donepezil HCl (ARICEPT PO)  LISINOPRIL PO  loperamide (IMODIUM A-D) 2 MG tablet  METFORMIN HCL PO  multivitamin, therapeutic with minerals (MULTI-VITAMIN) TABS tablet  omeprazole (PRILOSEC) 40 MG capsule  sulfamethoxazole-trimethoprim (BACTRIM DS) 800-160 MG tablet          Review of Systems   Constitutional: Negative for activity change, appetite change, chills, fatigue and fever.   Gastrointestinal: Negative for abdominal pain, diarrhea and nausea.    Genitourinary: Negative for decreased urine volume, difficulty urinating, dysuria, flank pain, frequency, hematuria and urgency.   Musculoskeletal: Positive for back pain.   Neurological: Negative for dizziness, light-headedness and headaches.   Psychiatric/Behavioral:        History of mild dementia   All other systems reviewed and are negative.      Physical Exam   BP: 147/90  Heart Rate: 97  Temp: 97.7  F (36.5  C)  Resp: 20  SpO2: 98 %      Physical Exam  Constitutional:       General: She is not in acute distress.     Appearance: She is not toxic-appearing.   Cardiovascular:      Rate and Rhythm: Normal rate and regular rhythm.      Pulses: Normal pulses.      Heart sounds: Normal heart sounds.   Pulmonary:      Effort: Pulmonary effort is normal.      Breath sounds: Normal breath sounds.   Abdominal:      General: Bowel sounds are normal.      Palpations: Abdomen is soft.      Tenderness: There is no right CVA tenderness or left CVA tenderness.   Musculoskeletal:      Lumbar back: She exhibits no tenderness, no bony tenderness, no swelling, no edema and no spasm.   Neurological:      Mental Status: She is alert.         ED Course        Procedures      Results for orders placed or performed during the hospital encounter of 07/11/20 (from the past 24 hour(s))   UA with Microscopic reflex to Culture    Specimen: Midstream Urine   Result Value Ref Range    Color Urine Light Yellow     Appearance Urine Clear     Glucose Urine >1000 (A) NEG^Negative mg/dL    Bilirubin Urine Negative NEG^Negative    Ketones Urine Negative NEG^Negative mg/dL    Specific Gravity Urine 1.031 1.003 - 1.035    Blood Urine Negative NEG^Negative    pH Urine 5.0 4.7 - 8.0 pH    Protein Albumin Urine Negative NEG^Negative mg/dL    Urobilinogen mg/dL Normal 0.0 - 2.0 mg/dL    Nitrite Urine Negative NEG^Negative    Leukocyte Esterase Urine Moderate (A) NEG^Negative    Source Midstream Urine     WBC Urine 12 (H) 0 - 5 /HPF    RBC Urine 0 0 -  2 /HPF    Bacteria Urine Few (A) NEG^Negative /HPF    Squamous Epithelial /HPF Urine 3 (H) 0 - 1 /HPF    Mucous Urine Present (A) NEG^Negative /LPF    Hyaline Casts 4 (A) OTO2^O - 2 /LPF       Medications - No data to display    Assessments & Plan (with Medical Decision Making)     I have reviewed the nursing notes.    I have reviewed the findings, diagnosis, plan and need for follow up with the patient.   Back pain does not seem to be muscular in nature. No tightness or spasm. UA is highly suspicious for UTI. Will treat with Bactrim BID. Pyelonephritis less likely, as no CVA tenderness or fever. Advised close follow up if symptoms have not improved within 24 hours.  present and both are in agreement. Natalya discharged to home in stable condition.    Discharge Medication List as of 7/11/2020 11:24 AM      START taking these medications    Details   sulfamethoxazole-trimethoprim (BACTRIM DS) 800-160 MG tablet Take 1 tablet by mouth 2 times daily for 3 days, Disp-6 tablet,R-0, E-Prescribe             Final diagnoses:   Urinary tract infection       7/11/2020   [facility]     Carol Fleming, APRN CNP  07/11/20 [id number]

## 2020-07-11 NOTE — ED TRIAGE NOTES
Patient presents today with c/o right lower back pain.   Ongoing for 3 days.   10/10.   Tight / squeezing pain  Denies hx of back issues in past, hx of kidney stones/infections, UTIs.   Denies urinary frequency, dysuria, fevers, chills, nausea, vomiting, diarrhea.   Tylenol / ibuprofen OTC PRN - some relief.   Denies any specific injury / trauma.   Denies any radiation of pain - consistent in right lower back pain.

## 2020-07-11 NOTE — DISCHARGE INSTRUCTIONS
Return to this department if the pain has not improved by tomorrow, or if it is getting worse.    Return if you develop a fever, chills, or body aches.

## 2020-07-13 LAB
BACTERIA SPEC CULT: ABNORMAL
SPECIMEN SOURCE: ABNORMAL

## 2020-07-14 ENCOUNTER — APPOINTMENT (OUTPATIENT)
Dept: CT IMAGING | Facility: HOSPITAL | Age: 75
End: 2020-07-14
Attending: EMERGENCY MEDICINE
Payer: MEDICARE

## 2020-07-14 ENCOUNTER — HOSPITAL ENCOUNTER (EMERGENCY)
Facility: HOSPITAL | Age: 75
Discharge: HOME | End: 2020-07-14
Attending: EMERGENCY MEDICINE | Admitting: EMERGENCY MEDICINE
Payer: MEDICARE

## 2020-07-14 VITALS
RESPIRATION RATE: 20 BRPM | DIASTOLIC BLOOD PRESSURE: 64 MMHG | OXYGEN SATURATION: 96 % | TEMPERATURE: 98 F | SYSTOLIC BLOOD PRESSURE: 147 MMHG

## 2020-07-14 DIAGNOSIS — R10.9 RIGHT FLANK PAIN: Primary | ICD-10-CM

## 2020-07-14 DIAGNOSIS — K80.20 GALLSTONES: ICD-10-CM

## 2020-07-14 LAB
ALBUMIN SERPL-MCNC: 4.3 G/DL (ref 3.4–5)
ALBUMIN UR-MCNC: 10 MG/DL
ALP SERPL-CCNC: 53 U/L (ref 40–150)
ALT SERPL W P-5'-P-CCNC: 32 U/L (ref 0–50)
ANION GAP SERPL CALCULATED.3IONS-SCNC: 4 MMOL/L (ref 3–14)
APPEARANCE UR: CLEAR
AST SERPL W P-5'-P-CCNC: 24 U/L (ref 0–45)
BACTERIA #/AREA URNS HPF: ABNORMAL /HPF
BASOPHILS # BLD AUTO: 0 10E9/L (ref 0–0.2)
BASOPHILS NFR BLD AUTO: 0.2 %
BILIRUB SERPL-MCNC: 1.1 MG/DL (ref 0.2–1.3)
BILIRUB UR QL STRIP: NEGATIVE
BUN SERPL-MCNC: 20 MG/DL (ref 7–30)
CALCIUM SERPL-MCNC: 9.5 MG/DL (ref 8.5–10.1)
CHLORIDE SERPL-SCNC: 105 MMOL/L (ref 94–109)
CO2 SERPL-SCNC: 27 MMOL/L (ref 20–32)
COLOR UR AUTO: YELLOW
CREAT SERPL-MCNC: 1.16 MG/DL (ref 0.52–1.04)
DIFFERENTIAL METHOD BLD: ABNORMAL
EOSINOPHIL # BLD AUTO: 0 10E9/L (ref 0–0.7)
EOSINOPHIL NFR BLD AUTO: 1 %
ERYTHROCYTE [DISTWIDTH] IN BLOOD BY AUTOMATED COUNT: 12.9 % (ref 10–15)
GFR SERPL CREATININE-BSD FRML MDRD: 45 ML/MIN/{1.73_M2}
GLUCOSE SERPL-MCNC: 122 MG/DL (ref 70–99)
GLUCOSE UR STRIP-MCNC: NEGATIVE MG/DL
HCT VFR BLD AUTO: 32.8 % (ref 35–47)
HGB BLD-MCNC: 11.8 G/DL (ref 11.7–15.7)
HGB UR QL STRIP: NEGATIVE
HYALINE CASTS #/AREA URNS LPF: 9 /LPF
IMM GRANULOCYTES # BLD: 0 10E9/L (ref 0–0.4)
IMM GRANULOCYTES NFR BLD: 0.2 %
KETONES UR STRIP-MCNC: 5 MG/DL
LEUKOCYTE ESTERASE UR QL STRIP: NEGATIVE
LYMPHOCYTES # BLD AUTO: 0.9 10E9/L (ref 0.8–5.3)
LYMPHOCYTES NFR BLD AUTO: 23 %
MCH RBC QN AUTO: 33.3 PG (ref 26.5–33)
MCHC RBC AUTO-ENTMCNC: 36 G/DL (ref 31.5–36.5)
MCV RBC AUTO: 93 FL (ref 78–100)
MONOCYTES # BLD AUTO: 0.3 10E9/L (ref 0–1.3)
MONOCYTES NFR BLD AUTO: 7.4 %
MUCOUS THREADS #/AREA URNS LPF: PRESENT /LPF
NEUTROPHILS # BLD AUTO: 2.8 10E9/L (ref 1.6–8.3)
NEUTROPHILS NFR BLD AUTO: 68.2 %
NITRATE UR QL: NEGATIVE
NRBC # BLD AUTO: 0 10*3/UL
NRBC BLD AUTO-RTO: 0 /100
PH UR STRIP: 5.5 PH (ref 4.7–8)
PLATELET # BLD AUTO: 156 10E9/L (ref 150–450)
POTASSIUM SERPL-SCNC: 3.9 MMOL/L (ref 3.4–5.3)
PROT SERPL-MCNC: 7 G/DL (ref 6.8–8.8)
RBC # BLD AUTO: 3.54 10E12/L (ref 3.8–5.2)
RBC #/AREA URNS AUTO: 1 /HPF (ref 0–2)
SODIUM SERPL-SCNC: 136 MMOL/L (ref 133–144)
SOURCE: ABNORMAL
SP GR UR STRIP: 1.03 (ref 1–1.03)
UROBILINOGEN UR STRIP-MCNC: NORMAL MG/DL (ref 0–2)
WBC # BLD AUTO: 4.1 10E9/L (ref 4–11)
WBC #/AREA URNS AUTO: 1 /HPF (ref 0–5)

## 2020-07-14 PROCEDURE — 74176 CT ABD & PELVIS W/O CONTRAST: CPT | Mod: TC

## 2020-07-14 PROCEDURE — 99284 EMERGENCY DEPT VISIT MOD MDM: CPT | Mod: Z6 | Performed by: EMERGENCY MEDICINE

## 2020-07-14 PROCEDURE — 99284 EMERGENCY DEPT VISIT MOD MDM: CPT | Mod: 25

## 2020-07-14 PROCEDURE — 81001 URINALYSIS AUTO W/SCOPE: CPT | Performed by: EMERGENCY MEDICINE

## 2020-07-14 PROCEDURE — 36415 COLL VENOUS BLD VENIPUNCTURE: CPT | Performed by: EMERGENCY MEDICINE

## 2020-07-14 PROCEDURE — 85025 COMPLETE CBC W/AUTO DIFF WBC: CPT | Performed by: EMERGENCY MEDICINE

## 2020-07-14 PROCEDURE — 80053 COMPREHEN METABOLIC PANEL: CPT | Performed by: EMERGENCY MEDICINE

## 2020-07-14 ASSESSMENT — ENCOUNTER SYMPTOMS
FEVER: 0
FLANK PAIN: 1
SHORTNESS OF BREATH: 0
ABDOMINAL PAIN: 0

## 2020-07-14 NOTE — ED AVS SNAPSHOT
[facility]  [address]  [place] [address]  Phone:  [phone]                                    Natalya Nelson   MRN: [id number]    Department:  [facility]   Date of Visit:  7/14/2020           After Visit Summary Signature Page    I have received my discharge instructions, and my questions have been answered. I have discussed any challenges I see with this plan with the nurse or doctor.    ..........................................................................................................................................  Patient/Patient Representative Signature      ..........................................................................................................................................  Patient Representative Print Name and Relationship to Patient    ..................................................               ................................................  Date                                   Time    ..........................................................................................................................................  Reviewed by Signature/Title    ...................................................              ..............................................  Date                                               Time          22EPIC Rev 08/18

## 2020-07-14 NOTE — ED PROVIDER NOTES
"  History     Chief Complaint   Patient presents with     Flank Pain     c/o right sided flank pain. Seen Saturday was diagnosed with a UTI and recieved a few days worth of antibiotics. Pain remains but is better. \"I would like the pain gone.\"      HPI  Natalya Nelson is a 75 year old female who presents to the emergency department with complaints of right-sided flank pain since this morning.  Originally the pain was very severe by the time of history and physical the pain had subsided and was rated as very mild.  Patient was seen in the emergency department with similar complaints on Saturday and was discharged home on some medication.  She denies any dysuria, hematuria, history of kidney stones, fever, chills, nausea, vomiting or diarrhea.  No history of trauma to the right flank.  She was diagnosed with a UTI on Saturday and is currently on antibiotics.    Allergies:  No Known Allergies    Problem List:    Patient Active Problem List    Diagnosis Date Noted     Lingual tonsil hypertrophy 10/09/2015     Priority: Medium     Dysphonia 10/09/2015     Priority: Medium     Globus pharyngeus 09/03/2015     Priority: Medium     LPRD (laryngopharyngeal reflux disease) 09/03/2015     Priority: Medium     DM w/o complication type II (H) 07/14/2015     Priority: Medium     Hypertriglyceridemia 07/14/2015     Priority: Medium     Osteopenia 07/14/2015     Priority: Medium     Essential hypertension 07/14/2015     Priority: Medium     Problem list name updated by automated process. Provider to review       Macrocytosis without anemia 07/14/2015     Priority: Medium     Thrombocytopenia (H) 07/14/2015     Priority: Medium     Leukopenia 07/14/2015     Priority: Medium     Lumbago 07/14/2015     Priority: Medium     Numbness of foot 07/14/2015     Priority: Medium     H/O colonoscopy 07/14/2015     Priority: Medium        Past Medical History:    Past Medical History:   Diagnosis Date     Abnormal complete blood count      " Carpal tunnel syndrome      Contact dermatitis and other eczema, due to unspecified cause      Elevated BP      Globus hystericus      Hypertriglyceridemia      Leukopenia      Leukopenia      Lumbago      Macrocytosis      Macrocytosis without anemia      Osteopenia      Other and unspecified hyperlipidemia      Sprain of medial collateral ligament of knee      Thrombocytopaenia      Type 2 diabetes mellitus (H)      Unspecified disorder of gallbladder      Unspecified essential hypertension        Past Surgical History:    Past Surgical History:   Procedure Laterality Date     PHACOEMULSIFICATION WITH STANDARD INTRAOCULAR LENS IMPLANT Left 5/8/2018    Procedure: PHACOEMULSIFICATION WITH STANDARD INTRAOCULAR LENS IMPLANT;  PHACOEMULSIFICATION CATARACT EXTRACTION POSTERIOR CHAMBER LENS LEFT ;  Surgeon: Robin Keane MD;  Location: [facility]     PHACOEMULSIFICATION WITH STANDARD INTRAOCULAR LENS IMPLANT Right 5/22/2018    Procedure: PHACOEMULSIFICATION WITH STANDARD INTRAOCULAR LENS IMPLANT;  PHACOEMULSIFICATION CATARCT EXTRACTION POSTERIOR CHAMBER LENS RIGHT, ;  Surgeon: Robin Keane MD;  Location: [facility]       Family History:    Family History   Problem Relation Age of Onset     Diabetes Son      Heart Disease Brother      Myocardial Infarction Brother      Myocardial Infarction Brother      Diabetes Brother        Social History:  Marital Status:   [2]  Social History     Tobacco Use     Smoking status: Never Smoker     Smokeless tobacco: Never Used   Substance Use Topics     Alcohol use: Not on file     Drug use: Not on file        Medications:    Alendronate Sodium (FOSAMAX PO)  Atorvastatin Calcium (LIPITOR PO)  calcium-vitamin D (CALTRATE) 600-400 MG-UNIT per tablet  Donepezil HCl (ARICEPT PO)  LISINOPRIL PO  loperamide (IMODIUM A-D) 2 MG tablet  METFORMIN HCL PO  multivitamin, therapeutic with minerals (MULTI-VITAMIN) TABS tablet  omeprazole (PRILOSEC) 40 MG capsule          Review of Systems    Constitutional: Negative for fever.   Respiratory: Negative for shortness of breath.    Cardiovascular: Negative for chest pain.   Gastrointestinal: Negative for abdominal pain.   Genitourinary: Positive for flank pain.   All other systems reviewed and are negative.      Physical Exam   BP: 129/76  Heart Rate: 79  Temp: 98.2  F (36.8  C)  Resp: 18  SpO2: 95 %      Physical Exam  Vitals signs and nursing note reviewed.   Constitutional:       General: She is not in acute distress.     Appearance: Normal appearance. She is not diaphoretic.   HENT:      Head: Normocephalic and atraumatic.      Mouth/Throat:      Pharynx: No oropharyngeal exudate.   Eyes:      General: No scleral icterus.     Pupils: Pupils are equal, round, and reactive to light.   Cardiovascular:      Heart sounds: Normal heart sounds.   Pulmonary:      Effort: No respiratory distress.      Breath sounds: Normal breath sounds.   Abdominal:      General: Bowel sounds are normal.      Palpations: Abdomen is soft.      Tenderness: There is no abdominal tenderness.   Musculoskeletal:         General: No tenderness.   Skin:     General: Skin is warm.      Findings: No rash.   Neurological:      Mental Status: She is alert.         ED Course   Patient evaluated and laboratory tests ordered including CT stone protocol.    Procedures      Results for orders placed or performed during the hospital encounter of 07/14/20 (from the past 24 hour(s))   UA with Microscopic   Result Value Ref Range    Color Urine Yellow     Appearance Urine Clear     Glucose Urine Negative NEG^Negative mg/dL    Bilirubin Urine Negative NEG^Negative    Ketones Urine 5 (A) NEG^Negative mg/dL    Specific Gravity Urine 1.027 1.003 - 1.035    Blood Urine Negative NEG^Negative    pH Urine 5.5 4.7 - 8.0 pH    Protein Albumin Urine 10 (A) NEG^Negative mg/dL    Urobilinogen mg/dL Normal 0.0 - 2.0 mg/dL    Nitrite Urine Negative NEG^Negative    Leukocyte Esterase Urine Negative NEG^Negative     Source Catheterized Urine     WBC Urine 1 0 - 5 /HPF    RBC Urine 1 0 - 2 /HPF    Bacteria Urine None (A) NEG^Negative /HPF    Mucous Urine Present (A) NEG^Negative /LPF    Hyaline Casts 9 (A) OTO2^O - 2 /LPF   CBC with platelets differential   Result Value Ref Range    WBC 4.1 4.0 - 11.0 10e9/L    RBC Count 3.54 (L) 3.8 - 5.2 10e12/L    Hemoglobin 11.8 11.7 - 15.7 g/dL    Hematocrit 32.8 (L) 35.0 - 47.0 %    MCV 93 78 - 100 fl    MCH 33.3 (H) 26.5 - 33.0 pg    MCHC 36.0 31.5 - 36.5 g/dL    RDW 12.9 10.0 - 15.0 %    Platelet Count 156 150 - 450 10e9/L    Diff Method Automated Method     % Neutrophils 68.2 %    % Lymphocytes 23.0 %    % Monocytes 7.4 %    % Eosinophils 1.0 %    % Basophils 0.2 %    % Immature Granulocytes 0.2 %    Nucleated RBCs 0 0 /100    Absolute Neutrophil 2.8 1.6 - 8.3 10e9/L    Absolute Lymphocytes 0.9 0.8 - 5.3 10e9/L    Absolute Monocytes 0.3 0.0 - 1.3 10e9/L    Absolute Eosinophils 0.0 0.0 - 0.7 10e9/L    Absolute Basophils 0.0 0.0 - 0.2 10e9/L    Abs Immature Granulocytes 0.0 0 - 0.4 10e9/L    Absolute Nucleated RBC 0.0    Comprehensive metabolic panel   Result Value Ref Range    Sodium 136 133 - 144 mmol/L    Potassium 3.9 3.4 - 5.3 mmol/L    Chloride 105 94 - 109 mmol/L    Carbon Dioxide 27 20 - 32 mmol/L    Anion Gap 4 3 - 14 mmol/L    Glucose 122 (H) 70 - 99 mg/dL    Urea Nitrogen 20 7 - 30 mg/dL    Creatinine 1.16 (H) 0.52 - 1.04 mg/dL    GFR Estimate 45 (L) >60 mL/min/[1.73_m2]    GFR Estimate If Black 53 (L) >60 mL/min/[1.73_m2]    Calcium 9.5 8.5 - 10.1 mg/dL    Bilirubin Total 1.1 0.2 - 1.3 mg/dL    Albumin 4.3 3.4 - 5.0 g/dL    Protein Total 7.0 6.8 - 8.8 g/dL    Alkaline Phosphatase 53 40 - 150 U/L    ALT 32 0 - 50 U/L    AST 24 0 - 45 U/L   CT Abdomen Pelvis w/o Contrast    Narrative    PROCEDURE:  CT ABDOMEN PELVIS W/O CONTRAST    HISTORY:  Flank pain, stone disease suspected - right    TECHNIQUE:  Helical CT of the abdomen and pelvis was performed without  intravenous  contrast.    COMPARISON:  None.    FINDINGS:      Evaluation of the solid organs is somewhat limited due to the lack of  intravenous contrast.    Limited views through the lung bases demonstrated mild scarring along  the medial margin of the right lower lobe.    Multiple hepatic parenchymal calcifications are present. There is a  1.5 cm calcified gallstone. No pericholecystic fluid or gallbladder  distention is seen. Perisplenic varicosities are present. No renal or  ureteral stone is identified. There is no hydronephrosis. A probable  left renal cortical cyst is noted. The bowel is normal in caliber. The  aorta is normal in size with scattered atherosclerotic calcifications.       No free fluid, free air or adenopathy is present.  No suspicious  osseous lesions are identified. Levoscoliosis is associated with  asymmetric degenerative changes.      Impression    IMPRESSION:      Perisplenic varicosities without splenomegaly.    No renal or ureteral stones or hydronephrosis. No finding to account  for acute right flank pain    CARMELLA BARRETO MD       Medications - No data to display    Assessments & Plan (with Medical Decision Making)   Right flank pain: Presented to the ED with complaints of right flank pain that has been recurrent since Saturday.  CT scan done showed perisplenic varicosities without splenomegaly.  It also showed a 1.5 cm calcified gallstone but this should not explain the right flank pain.  The patient's pain had subsided at the time of history and physical to minimal pain.  CT scan however ruled out gallstones or other intra-abdominal pathologies that could explain her right flank pain.  Urinalysis was negative though patient is currently on antibiotics for acute cystitis since Saturday.  Normal CBC and CMP.  In view of normal clinical evaluation and labs, advised patient to continue with current antibiotics and use OTC Motrin Tylenol as needed for pain and follow-up with PCP as outpatient  once treatment is completed.  She may certainly return to ED if condition worsens.    I have reviewed the nursing notes.    I have reviewed the findings, diagnosis, plan and need for follow up with the patient.    Discharge Medication List as of 7/14/2020 11:38 AM          Final diagnoses:   Right flank pain   Gallstones       7/14/2020   [facility]     James Thomas MD  07/14/20 6512

## 2020-07-29 ENCOUNTER — OFFICE VISIT (OUTPATIENT)
Dept: SURGERY | Facility: OTHER | Age: 75
End: 2020-07-29
Attending: EMERGENCY MEDICINE
Payer: COMMERCIAL

## 2020-07-29 VITALS
HEIGHT: 64 IN | OXYGEN SATURATION: 97 % | DIASTOLIC BLOOD PRESSURE: 72 MMHG | HEART RATE: 64 BPM | SYSTOLIC BLOOD PRESSURE: 138 MMHG | TEMPERATURE: 97.7 F | BODY MASS INDEX: 19.81 KG/M2 | WEIGHT: 116 LBS

## 2020-07-29 DIAGNOSIS — K80.20 GALLSTONES: ICD-10-CM

## 2020-07-29 PROCEDURE — 99203 OFFICE O/P NEW LOW 30 MIN: CPT | Performed by: SURGERY

## 2020-07-29 PROCEDURE — G0463 HOSPITAL OUTPT CLINIC VISIT: HCPCS

## 2020-07-29 ASSESSMENT — MIFFLIN-ST. JEOR: SCORE: 996.17

## 2020-07-29 ASSESSMENT — PAIN SCALES - GENERAL: PAINLEVEL: NO PAIN (0)

## 2020-07-29 NOTE — NURSING NOTE
"Chief Complaint   Patient presents with     Consult     Gallbladder [id number] referral       Initial /72 (BP Location: Right arm, Patient Position: Sitting, Cuff Size: Adult Regular)   Pulse 64   Temp 97.7  F (36.5  C) (Tympanic)   Ht 1.626 m (5' 4\")   Wt 52.6 kg (116 lb)   SpO2 97%   BMI 19.91 kg/m   Estimated body mass index is 19.91 kg/m  as calculated from the following:    Height as of this encounter: 1.626 m (5' 4\").    Weight as of this encounter: 52.6 kg (116 lb).  Medication Reconciliation: complete  Mabel Connors MA    "

## 2020-08-04 NOTE — PROGRESS NOTES
[facility] Surgery Consultation    CC:  History of right flank pain     HPI:  This 75 year old year old female is seen at the request of Dr. More for evaluation of right flank pain. She was seen in the ED on 7/14 for severe right flank pain that had began that morning. She had not had pain like that before, it had mostly subsided prior to leaving the ED. She did have a CT scan looking for Kidney stone which did show a rather large calcified gallstone. She is dealing with what sounds like dementia and her  is here today to help with history. She denies any similar pain since. She denies any previous episodes. Denies difficulty in eating any types of foods. She is feeling quite well today.     Past Medical History:   Diagnosis Date     Abnormal complete blood count      Carpal tunnel syndrome      Contact dermatitis and other eczema, due to unspecified cause      Elevated BP      Globus hystericus      Hypertriglyceridemia      Leukopenia      Leukopenia      Lumbago      Macrocytosis      Macrocytosis without anemia      Osteopenia      Other and unspecified hyperlipidemia      Sprain of medial collateral ligament of knee      Thrombocytopaenia      Type 2 diabetes mellitus (H)      Unspecified disorder of gallbladder      Unspecified essential hypertension        Past Surgical History:   Procedure Laterality Date     PHACOEMULSIFICATION WITH STANDARD INTRAOCULAR LENS IMPLANT Left 5/8/2018    Procedure: PHACOEMULSIFICATION WITH STANDARD INTRAOCULAR LENS IMPLANT;  PHACOEMULSIFICATION CATARACT EXTRACTION POSTERIOR CHAMBER LENS LEFT ;  Surgeon: Robin Keane MD;  Location: [facility] OR     PHACOEMULSIFICATION WITH STANDARD INTRAOCULAR LENS IMPLANT Right 5/22/2018    Procedure: PHACOEMULSIFICATION WITH STANDARD INTRAOCULAR LENS IMPLANT;  PHACOEMULSIFICATION CATARCT EXTRACTION POSTERIOR CHAMBER LENS RIGHT, ;  Surgeon: Robin Keane MD;  Location: [facility] OR       No Known Allergies    Current Outpatient  "Medications   Medication     Alendronate Sodium (FOSAMAX PO)     Atorvastatin Calcium (LIPITOR PO)     calcium-vitamin D (CALTRATE) 600-400 MG-UNIT per tablet     Donepezil HCl (ARICEPT PO)     LISINOPRIL PO     loperamide (IMODIUM A-D) 2 MG tablet     METFORMIN HCL PO     multivitamin, therapeutic with minerals (MULTI-VITAMIN) TABS tablet     omeprazole (PRILOSEC) 40 MG capsule     No current facility-administered medications for this visit.        HABITS:    Social History     Tobacco Use     Smoking status: Never Smoker     Smokeless tobacco: Never Used   Substance Use Topics     Alcohol use: None     Drug use: None       Family History   Problem Relation Age of Onset     Diabetes Son      Heart Disease Brother      Myocardial Infarction Brother      Myocardial Infarction Brother      Diabetes Brother        REVIEW OF SYSTEMS:  Ten point review of systems negative except those mentioned in the HPI.     OBJECTIVE:    /72 (BP Location: Right arm, Patient Position: Sitting, Cuff Size: Adult Regular)   Pulse 64   Temp 97.7  F (36.5  C) (Tympanic)   Ht 1.626 m (5' 4\")   Wt 52.6 kg (116 lb)   SpO2 97%   BMI 19.91 kg/m      GENERAL: Generally appears well, in no distress with appropriate affect.  HEENT:   Sclerae anicteric - normocephalic atraumatic   Respiratory:  No acute distress, no splinting   Cardiovascular:  Regular Rate and Rhythm  Abdomen: non-distended   :  deferred  Extremities:  Extremities normal. No deformities, edema, or skin discoloration.  Skin:  no suspicious lesions or rashes  Neurological: grossly intact  Psych:  Happy alert, forgetful.     IMPRESSION:    75 y.o. female with unexplained right flank pain, initial episode without subsequent, I reviewed CT scan and the gallstone would make possible that this was bilary colic. At this point I don't think I would be helping her much by offering surgery. I discussed the symptoms associated with gallstones with patient and  and they " will return if these arise.      PLAN:   Continue observation.     Jovan Boles MD,     8/4/2020  7:31 AM

## 2020-08-06 ENCOUNTER — HOSPITAL ENCOUNTER (OUTPATIENT)
Dept: BONE DENSITY | Facility: HOSPITAL | Age: 75
Discharge: HOME | End: 2020-08-06
Attending: NURSE PRACTITIONER | Admitting: NURSE PRACTITIONER
Payer: MEDICARE

## 2020-08-06 DIAGNOSIS — M85.80 OSTEOPENIA, UNSPECIFIED LOCATION: ICD-10-CM

## 2020-08-06 DIAGNOSIS — Z78.0 POSTMENOPAUSE: ICD-10-CM

## 2020-08-06 PROCEDURE — 77080 DXA BONE DENSITY AXIAL: CPT | Mod: TC

## 2021-02-23 ENCOUNTER — TRANSFERRED RECORDS (OUTPATIENT)
Dept: HEALTH INFORMATION MANAGEMENT | Facility: CLINIC | Age: 76
End: 2021-02-23

## 2021-02-25 ENCOUNTER — HOSPITAL ENCOUNTER (OUTPATIENT)
Dept: PHYSICAL THERAPY | Facility: HOSPITAL | Age: 76
Setting detail: THERAPIES SERIES
End: 2021-02-25
Attending: NURSE PRACTITIONER
Payer: MEDICARE

## 2021-02-25 PROCEDURE — 97140 MANUAL THERAPY 1/> REGIONS: CPT | Mod: GP

## 2021-02-25 PROCEDURE — 97162 PT EVAL MOD COMPLEX 30 MIN: CPT | Mod: GP

## 2021-02-25 PROCEDURE — 97530 THERAPEUTIC ACTIVITIES: CPT | Mod: GP,59

## 2021-02-25 NOTE — PROGRESS NOTES
02/25/21 0921   General Information   Type of Visit Initial OP Ortho PT Evaluation   Start of Care Date 02/25/21   Referring Physician ANNAMARIE Etienne, CNP   Orders Evaluate and Treat   Date of Order 02/23/21   Certification Required? Yes   Medical Diagnosis Acute LBP without sciatica   Surgical/Medical history reviewed Yes   Precautions/Limitations no known precautions/limitations   Body Part(s)   Body Part(s) Lumbar Spine/SI   Presentation and Etiology   Pertinent history of current problem (include personal factors and/or comorbidities that impact the POC) C/O LBP. Patient arrived with her  Peter. She wanted the extra set of ears because she is dealing with dementia. The back pain had been on again off again. She is able to sleep through the night. She has difficulty when she comes from sit to stand.    Impairments A. Pain   Functional Limitations perform activities of daily living   How/Where did it occur From insidious onset   Onset date of current episode/exacerbation 01/25/21   Pain rating (0-10 point scale) Best (/10);Worst (/10);Other   Best (/10) 0   Worst (/10) 10   Pain rating comment 0-1/10 right now   Current Level of Function   Current Community Support Family/friend caregiver   Patient role/employment history F. Retired   Fall Risk Screen   Fall screen completed by PT   Have you fallen 2 or more times in the past year? No   Have you fallen and had an injury in the past year? No   Is patient a fall risk? No   System Outcome Measures   Outcome Measures Low Back Pain (see Oswestry and Lucie)   Lumbar Spine/SI Objective Findings   Palpation Left SI jt locked and left LE 1/2 inch longer than right. Left ASIS, IC, PSIS, and IT all inferior relative to right. Right sacral sulcus deep and right SHAWN prominent. T3-L5=NSRRL.   Planned Therapy Interventions   Planned Therapy Interventions manual therapy;neuromuscular re-education;strengthening;stretching   Clinical Impression   Criteria for  Skilled Therapeutic Interventions Met yes, treatment indicated   PT Diagnosis LBP mediated by somatic dysfunction at pelvic, sacral, lumbar, and thoracic segments, with functional leg length difference.   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   Education Assessment   Barriers to Learning Cognitive;Other  (dementia)   ORTHO GOALS   PT Ortho Eval Goals 1;2   Ortho Goal 1   Goal Identifier 1 Functional   Goal Description Patient will be able to perform sit to stand without pain   Target Date 03/25/21   Ortho Goal 2   Goal Identifier 2 Outcome   Goal Description Resolution of somatic dysfunction   Target Date 03/25/21   Total Evaluation Time   PT Eval, Moderate Complexity Minutes (04691) 15     Sari Simmons DPT    Please sign and date this report, and return by fax to [phone] for Medicare reasons. Thank you.      I certify the need for these services furnished under this plan of treatment and while under my care. (Physician co-signature of this document indicates review and certification of the therapy plan).      _____________________________     __________________________    ____________  Physician's Signature                 Date               Time

## 2021-03-03 ENCOUNTER — HOSPITAL ENCOUNTER (OUTPATIENT)
Dept: PHYSICAL THERAPY | Facility: HOSPITAL | Age: 76
Setting detail: THERAPIES SERIES
End: 2021-03-03
Attending: NURSE PRACTITIONER
Payer: MEDICARE

## 2021-03-03 PROCEDURE — 97140 MANUAL THERAPY 1/> REGIONS: CPT | Mod: GP

## 2021-03-09 ENCOUNTER — IMMUNIZATION (OUTPATIENT)
Dept: FAMILY MEDICINE | Facility: OTHER | Age: 76
End: 2021-03-09
Attending: FAMILY MEDICINE
Payer: MEDICARE

## 2021-03-09 PROCEDURE — 0031A PR COVID VAC JANSSEN AD26 0.5ML: CPT

## 2021-03-10 ENCOUNTER — HOSPITAL ENCOUNTER (OUTPATIENT)
Dept: PHYSICAL THERAPY | Facility: HOSPITAL | Age: 76
Setting detail: THERAPIES SERIES
End: 2021-03-10
Attending: NURSE PRACTITIONER
Payer: MEDICARE

## 2021-03-10 PROCEDURE — 97140 MANUAL THERAPY 1/> REGIONS: CPT | Mod: GP

## 2021-03-10 NOTE — PROGRESS NOTES
Outpatient Physical Therapy Discharge Note     Patient: Natalya Nelson  : 1945    Beginning/End Dates of Reporting Period:  2021 to 3/10/2021    Referring Provider: ANNAMARIE Etienne, CNP    Therapy Diagnosis: Acute LBP mediated by somatic dysfunction at pelvic, sacral, lumbar, and thoracic segments, with functional leg length difference.     Client Self Report: Patient seen [id number] for C/O LBP. Arrived with  Peter. Has been feeling good. Once in awhile she has a little discomfort.     Objective Measurements:  Objective Measure: Somatic dysfunction  Details: Dural sheath slightly sluggish today, but not adhered. Leg lengths equal, pelvis level, and SI jts equally mobile. Sacral and lumbar segments WFL of alignment.     Goals:  Goal Identifier 1 Functional   Goal Description Patient will be able to perform sit to stand without pain   Target Date 21   Date Met  21   Progress:     Goal Identifier 2 Outcome   Goal Description Resolution of somatic dysfunction   Target Date 21   Date Met  21   Progress:       Progress Toward Goals:   Progress this reporting period: yes, goals met    Plan:  Discharge from therapy.    Discharge:    Reason for Discharge: Patient has met all goals.    Equipment Issued: n/a    Discharge Plan: Patient to continue normal ADLs as able.    Sari Simmons DPT

## 2021-11-19 ENCOUNTER — IMMUNIZATION (OUTPATIENT)
Dept: FAMILY MEDICINE | Facility: OTHER | Age: 76
End: 2021-11-19
Attending: NURSE PRACTITIONER
Payer: MEDICARE

## 2021-11-19 PROCEDURE — 0034A PR COVID VAC JANSSEN AD26 0.5ML: CPT

## 2024-11-18 NOTE — TELEPHONE ENCOUNTER
METFORMIN HCL PO  Last Written Prescription Date:  historical medication   Last Fill Quantity: 0,   # refills: 0  Last Office Visit: has not been seen at [facility]   Future Office visit:      No

## (undated) DEVICE — HANDPIECE-CAPSULEGUARD I/A STELLARIS

## (undated) DEVICE — KNIFE-MICRO UNITOME 5.0MM

## (undated) DEVICE — BIN-CATARACT BIN

## (undated) DEVICE — GLV-7.5 PROTEXIS PI CLASSIC LF/PF

## (undated) DEVICE — INSTRUMENT WIPE-VISIWIPE

## (undated) DEVICE — BIN-TECNIS DCB00 LENSES

## (undated) DEVICE — SENSOR-OXISENSOR II ADULT

## (undated) DEVICE — GLV-7.0 PROTEXIS PI CLASSIC LF/PF

## (undated) DEVICE — KNIFE-KERATOME SLIT 2.8MM

## (undated) DEVICE — LENS DELIVERY SYSTEM-SOFPORT LI61AO (EZ-28)

## (undated) DEVICE — BETADINE 5% STERILE OPHTHALMIC SOLUTION 1 OZ.

## (undated) DEVICE — BIN-LENS IMPLANT CART

## (undated) DEVICE — CANNULA-NUCLEUS HYDRODISSECTOR

## (undated) DEVICE — CYSTOTOME-IRRIGATING  25G

## (undated) DEVICE — PACK-EYE-CUSTOM

## (undated) DEVICE — PACK-PHACO STELLARIS

## (undated) DEVICE — LABEL-STERILE PREPRINTED FOR OR